# Patient Record
Sex: FEMALE | Race: WHITE | NOT HISPANIC OR LATINO | Employment: FULL TIME | URBAN - METROPOLITAN AREA
[De-identification: names, ages, dates, MRNs, and addresses within clinical notes are randomized per-mention and may not be internally consistent; named-entity substitution may affect disease eponyms.]

---

## 2019-07-26 ENCOUNTER — OFFICE VISIT - HEALTHEAST (OUTPATIENT)
Dept: FAMILY MEDICINE | Facility: CLINIC | Age: 59
End: 2019-07-26

## 2019-07-26 DIAGNOSIS — R68.89 HEAT INTOLERANCE: ICD-10-CM

## 2019-07-26 DIAGNOSIS — M79.89 SWELLING OF RIGHT LITTLE FINGER: ICD-10-CM

## 2019-07-30 ENCOUNTER — OFFICE VISIT - HEALTHEAST (OUTPATIENT)
Dept: FAMILY MEDICINE | Facility: CLINIC | Age: 59
End: 2019-07-30

## 2019-07-30 DIAGNOSIS — Z13.1 SCREENING FOR DIABETES MELLITUS: ICD-10-CM

## 2019-07-30 DIAGNOSIS — Z11.59 ENCOUNTER FOR HEPATITIS C SCREENING TEST FOR LOW RISK PATIENT: ICD-10-CM

## 2019-07-30 DIAGNOSIS — M79.89 SWELLING OF RIGHT LITTLE FINGER: ICD-10-CM

## 2019-07-30 DIAGNOSIS — Z11.4 ENCOUNTER FOR SCREENING FOR HIV: ICD-10-CM

## 2019-07-30 DIAGNOSIS — Z13.220 SCREENING CHOLESTEROL LEVEL: ICD-10-CM

## 2019-07-30 ASSESSMENT — MIFFLIN-ST. JEOR: SCORE: 1263.95

## 2019-08-01 ENCOUNTER — COMMUNICATION - HEALTHEAST (OUTPATIENT)
Dept: SCHEDULING | Facility: CLINIC | Age: 59
End: 2019-08-01

## 2020-01-28 ENCOUNTER — COMMUNICATION - HEALTHEAST (OUTPATIENT)
Dept: FAMILY MEDICINE | Facility: CLINIC | Age: 60
End: 2020-01-28

## 2021-01-21 PROBLEM — M81.8 ADULT IDIOPATHIC GENERALIZED OSTEOPOROSIS: Status: ACTIVE | Noted: 2021-01-21

## 2021-01-21 SDOH — HEALTH STABILITY: MENTAL HEALTH: HOW OFTEN DO YOU HAVE 6 OR MORE DRINKS ON ONE OCCASION?: NOT ASKED

## 2021-01-21 SDOH — HEALTH STABILITY: MENTAL HEALTH: HOW MANY STANDARD DRINKS CONTAINING ALCOHOL DO YOU HAVE ON A TYPICAL DAY?: NOT ASKED

## 2021-01-21 SDOH — HEALTH STABILITY: MENTAL HEALTH: HOW OFTEN DO YOU HAVE A DRINK CONTAINING ALCOHOL?: NOT ASKED

## 2021-01-26 ENCOUNTER — VIRTUAL VISIT (OUTPATIENT)
Dept: NEUROLOGY | Facility: CLINIC | Age: 61
End: 2021-01-26
Payer: COMMERCIAL

## 2021-01-26 VITALS — HEIGHT: 67 IN | BODY MASS INDEX: 23.23 KG/M2 | WEIGHT: 148 LBS

## 2021-01-26 DIAGNOSIS — G24.3 SPASMODIC TORTICOLLIS: Primary | ICD-10-CM

## 2021-01-26 DIAGNOSIS — M81.8 ADULT IDIOPATHIC GENERALIZED OSTEOPOROSIS: ICD-10-CM

## 2021-01-26 PROCEDURE — 99213 OFFICE O/P EST LOW 20 MIN: CPT | Mod: 95 | Performed by: PSYCHIATRY & NEUROLOGY

## 2021-01-26 RX ORDER — CLONAZEPAM 0.5 MG/1
0.5 TABLET ORAL 2 TIMES DAILY
Qty: 60 TABLET | Refills: 5 | Status: SHIPPED | OUTPATIENT
Start: 2021-01-26 | End: 2021-08-02

## 2021-01-26 RX ORDER — CLONAZEPAM 0.5 MG/1
0.5 TABLET ORAL 2 TIMES DAILY
COMMUNITY
Start: 2021-01-04 | End: 2021-01-26

## 2021-01-26 RX ORDER — IBUPROFEN 200 MG
200 TABLET ORAL PRN
COMMUNITY

## 2021-01-26 ASSESSMENT — MIFFLIN-ST. JEOR: SCORE: 1263.95

## 2021-01-26 NOTE — NURSING NOTE
Chief Complaint   Patient presents with     Annual Visit     Follow up for torticollis. Pt states no concerns, has been doing well.     Phone visit     [phone]     Maame Acuna LPN on 1/26/2021 at 10:14 AM

## 2021-01-26 NOTE — LETTER
"    1/26/2021         RE: Adriane Sylvester  [address]  [address]  [facility] [address]        Dear Colleague,    Thank you for referring your patient, Adriane Sylvester, to the [facility] [place]. Please see a copy of my visit note below.    Telephone follow-up visit requested by patient  Telephone follow-up visit due to the COVID-19 pandemic  Telephone number [phone]  Patient identified    The patient has been notified of following:     \"This telephone visit will be conducted via a call between you and your physician/provider. We have found that certain health care needs can be provided without the need for a physical exam. This service lets us provide the care you need with a short phone conversation. If a prescription is necessary we can send it directly to your pharmacy. If lab work is needed we can place an order for that and you can then stop by our lab to have the test done at a later time.    If during the course of the call the physician/provider feels a telephone visit is not appropriate, you will not be charged for this service.\"     Patient has given verbal consent for Telephone visit? Yes  Consent has been obtained for this service by 1 care team member: yes.              Chief complaint  Torticollis is stable  Does get some \"headaches\" more of a jaw achiness on the left side  Not new      History of Present Illness   Seen January 20, 2020  Today's visit January 26, 2021 telephone follow-up visit        60-year-old here for follow-up of her torticollis  She has head turning tilt with slight turned to the left with the left sternocleidomastoid muscle looking tighter    Since last seen  No hospitalizations  No surgeries  No major illnesses    Does have some chronic \"headache\" more of an achiness in the left jaw saw a chiropractor in the past  Question whether a mouth block would help a little bit for some TMJ aggravation secondary to her torticollis  Discussed the use of " "clonazepam and the importance of not going off the medication abruptly as her body is used to it.  Talked about the risks and benefits of medications  Has had no problems with the medication    She also has some osteoporosis probably and supposed to be on some vitamin D did decide to start taking it now  Rediscussed the fact that she did not get her DEXA scan, that she needs to be on the vitamin D to prevent further osteoporosis.  Rediscussed the medication, rediscussed osteoporosis, and vitamin D replacement.    We discussed the risk benefits of the clonazepam        She has the diagnoses of:    1. Torticollis diagnosed in 1996 by Dr. Mica Myrick.  2. History of being on clonazepam for a long time since 1999.  3. We have discussed Botox in the past. She has chosen not to use that, has had success with clonazepam.  4. Has failed multiple medications including Artane, baclofen, and Tegretol in the past.  5. Knows the risk of coming off the clonazepam quickly, knows that she would have to taper off slowly, re-reviewed these today.      Family history  Mother with dementia, hypertension  Father with heart disease lived to be 93  History of diabetes and osteoporosis in the family      Habits:  She is a nonsmoker, might have a rare alcoholic beverage.        Review of Systems     Has \"some headaches\"  These are really more of an achiness in her left jaw     no chest pain no shortness of breath no nausea vomiting no diarrhea no fever chills    No diplopia dysarthria dysphasia  No focal weakness numbness or tingling torticollis is the same tolerates it well no significant tremor no gait problems no bowel or bladder difficulties       Exam documentation from previous in person evaluations for comparison in the future     HEENT examination significant for a normal head tilt he only has both a turn to the left and a tilt her left sternocleidomastoid muscle looks a little bit tighter she also has needed right clavicle she " has some kyphosis and she has some head to the patient  She has some voice tremor    She has clear lungs regular heart rate symmetrical pulses no edema the feet    Neurologic exam  Torticollis as above  Mental status is normal  No aphasia no neglect normal memory recall    Cranial nerves II through XII  Some tremor in her voice  Some head to the patient  Extractor movements intact no nystagmus ophthalmoplegia no visual field cut tongue twisters are understandable face is symmetrical smile symmetrical    Upper extremities no significant tremor finger-nose is good no focal weakness    Motor strength coronation gait are normal  She has kyphosis we discussed osteoporosis and vitamin D replacement      Patient states that she is stable      Assessment/Plan         1.  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  Question some left TMJ symptomatology aggravated by her spasmodic torticollis    Clonazepam 0.5 mg tablet 1 tab p.o. twice daily need another prescription 6 months  Rediscussed the medication, rediscussed osteoporosis, and vitamin D replacement.    Discussed possibly having her get a mouth block might need to see a specialist to have it fitted she is going to be seeing her dentist in the near future      Current plan:    1. Clonazepam 0.5 mg one tablet p.o. b.i.d. She will need another prescription update in six months.  2. Follow up on a yearly basis.  3. Should stay on the vitamin D, rediscussed.  4. Needs to have a primary physician who sees her regularly for healthcare needs.      Should be on some vitamin D osteoporosis  Should follow-up closely with her primary for healthcare maintenance        Set patient up for in person evaluation November 2021 as this was a telephone visit    21 minutes of discussion time today with patient about multiple symptoms and signs above medications risks and benefits.            Again, thank you for allowing me to participate in the care of your patient.        Sincerely,        Drake  Los Velazquez MD

## 2021-05-30 NOTE — PROGRESS NOTES
"[facility] Clinic Note    Name: Adriane Sylvester  : 1960   MRN: [id number]    Adriane Sylvester is a 59 y.o. female presenting to discuss the following:     CC:   Chief Complaint   Patient presents with     Follow-up     Right pinky pain and swelling       HPI:  Previously seen at [facility], hasn't been seen for a while.     Right handed female, right pinky finger has been bent for last 1 year, started to have pain intermittently that shoots down finger, consistently swollen, not worsening. Went to urgent care and had imaging done, recommended following up with primary care for discussion of chronic swelling.     ROS:   See HPI above.     PMH:   Patient Active Problem List   Diagnosis     Spasmodic Torticollis     Vaginitis     Seborrheic Keratosis     Depression     Follows with Dr. Velazquez at [facility] for spasmodic torticollis and prescription of benzodiazepine.     No past medical history on file.    PSH:   No past surgical history on file.      MEDICATIONS:   Current Outpatient Medications on File Prior to Visit   Medication Sig Dispense Refill     clonazePAM (KLONOPIN) 0.5 MG tablet Take 0.5 mg by mouth 2 (two) times a day.  5     ibuprofen (ADVIL,MOTRIN) 200 MG tablet Take 200 mg by mouth every 6 (six) hours as needed for pain.       No current facility-administered medications on file prior to visit.        ALLERGIES:  No Known Allergies    FAMHx:  Family History   Problem Relation Age of Onset     Dementia Mother      Gout Father      Diabetes Father         in 70's     Breast cancer Sister         in 40's     Colon cancer Neg Hx        SOCIAL HISTORY:   Social History     Tobacco Use     Smoking status: Never Smoker     Smokeless tobacco: Never Used   Substance Use Topics     Alcohol use: Not on file     Drug use: Not on file       PHYSICAL EXAM:   /70   Pulse 72   Temp 97.9  F (36.6  C) (Oral)   Resp 12   Ht 5' 7\" (1.702 m)   Wt 148 lb (67.1 kg)   BMI 23.18 kg/m  "    GENERAL: Adriane is a pleasant, well appearing female, appears stated age, in no acute distress.   HEENT: Sclera white, no nasal discharge, oropharynx pink and moist.   HEART: Regular rate and rhythm, no murmurs.   LUNGS: Clear to auscultation bilaterally, unlabored.   ABDOMEN: Soft, non-tender to palpation  MSK: Right pinky finger soft tissue around middle phalanx is mildly edematous and erythematous. PIP and DIP do not appear effected. No synovitis or warmth appreciated. No focal fluctuance. Range of motion intact.     IMAGING: (from walk in care)  HAND X-RAY: Degenerative change at the right small finger IP joints. No erosive  changes are identified. Soft tissue swelling is noted adjacent to the middle  phalanx, but this does not appear centered on the joint. There is suggestion of  some increased density consistent with a focal nodular mass, and MRI may be  helpful for further evaluation if indicated.    ASSESSMENT & PLAN:   Adriane Sylvester is a 59 y.o. female presenting today for evaluation of right hand pinky swelling, chronic. Due for health maintenance screening, not fasting so will return for labs.     1. Swelling of right little finger  - Ambulatory referral to Orthopedics    Symptoms are not consistent with ganglion cyst, cellulitis, focal abscess, sebaceous cyst, osteoarthritis, rheumatoid arthritis, gout, or pseudogout. No acute changes to symptoms. Recommended referral to hand orthopedics for further evaluation.     2. Screening cholesterol level  - Lipid Cascade FASTING; Future    3. Screening for diabetes mellitus  - Basic Metabolic Panel; Future    4. Encounter for screening for HIV  - HIV Antigen/Antibody Screening Cascade; Future    5. Encounter for hepatitis C screening test for low risk patient  - Hepatitis C Antibody (Anti-HCV); Future    RTC: Due for health maintenance/preventive exam - return for annual physical exam in 1 month.     Kemi White DO

## 2021-05-31 NOTE — TELEPHONE ENCOUNTER
Spoke to pt, relayed Dr. White's message below.  Pt verbalizes understanding, no further questions.

## 2021-06-03 VITALS — BODY MASS INDEX: 23.23 KG/M2 | HEIGHT: 67 IN | WEIGHT: 148 LBS

## 2021-06-03 VITALS — WEIGHT: 148 LBS

## 2021-06-20 NOTE — LETTER
Letter by Kemi White DO at      Author: Kemi White DO Service: -- Author Type: --    Filed:  Encounter Date: 1/28/2020 Status: (Other)       Adriane Sylvester  [address]  [facility] [address]           January 28, 2020    Dear Adriane    In reviewing your records, we have determined a gap in your preventive services. Based on your age and health history, we recommend the follow service.     ? General Physical  ? Physical with a Pap Smear  ? Colon cancer screening  ? Mammogram  ? Immunization  ? Diabetic check  ? Blood pressure/cardiovascular check  ? Asthma check  ? Cholesterol test  ? Lab work  ? Med check    If you have had the service elsewhere, please contact us so we can update our records. Please let us know if you have transferred your care to another clinic.    Please call [phone] to schedule this appointment.    We believe that a strong preventive care program, including regular physicals and follow-up care is an important part of a healthy lifestyle and we are committed to helping you maintain your health.    Thank you for choosing us as your health care provider.    Sincerely,   [facility]  [address] [place] [id number]  Phone Number:  [phone]

## 2021-06-27 NOTE — PROGRESS NOTES
Progress Notes by Yocasta Umaña PA-C at 7/26/2019 11:50 AM     Author: Yocasta Umaña PA-C Service: -- Author Type: Physician Assistant    Filed: 7/26/2019  3:11 PM Encounter Date: 7/26/2019 Status: Signed    : Yocasta Umaña PA-C (Physician Assistant)       Chief Complaint   Patient presents with   ? Hand Pain     right pinky finger swelling x1 year       HPI:  Adriane Sylvester is a 58 y.o. female who presents today complaining of right pinky finger swelling for the past 1 year.  The swelling comes and goes.  She denies any known injury.  She never thought much of it, but lately she is been having shooting pains with the swelling.  She is never been evaluated for this finger swelling before.  She is postmenopausal and her father had a history of gout.    History obtained from the patient.    Problem List:  Spasmodic Torticollis  Vaginitis  Seborrheic Keratosis  Depression      No past medical history on file.    Social History     Tobacco Use   ? Smoking status: Never Smoker   ? Smokeless tobacco: Never Used   Substance Use Topics   ? Alcohol use: Not on file       Review of Systems   Constitutional: Positive for unexpected weight change (Gaining weight more easily.).   Endocrine: Positive for heat intolerance. Negative for cold intolerance, polydipsia, polyphagia and polyuria.       Vitals:    07/26/19 1207   BP: 104/70   Patient Site: Right Arm   Patient Position: Sitting   Cuff Size: Adult Regular   Pulse: 69   Resp: 16   Temp: 98.2  F (36.8  C)   TempSrc: Oral   SpO2: 96%   Weight: 148 lb (67.1 kg)       Physical Exam   Constitutional: She appears well-developed and well-nourished. No distress.   HENT:   Head: Normocephalic and atraumatic.   Right Ear: External ear normal.   Left Ear: External ear normal.   Eyes: Conjunctivae are normal. Right eye exhibits no discharge. Left eye exhibits no discharge.   Pulmonary/Chest: Effort normal. No respiratory distress.   Musculoskeletal:        Right hand: She  exhibits swelling (soft tissue swelling between right 5th PIP and DIP joint). She exhibits normal range of motion, no tenderness, no bony tenderness, normal capillary refill and no laceration. Normal sensation noted. Normal strength noted.   Skin: Skin is warm. Capillary refill takes less than 2 seconds. No rash noted. She is not diaphoretic. No erythema.   Psychiatric: She has a normal mood and affect. Her behavior is normal. Judgment and thought content normal.           Radiology:  I have personally ordered and preliminarily reviewed the following xray, I have noted no signs of bony deformity, fractures, osteophytes.  There is soft tissue swelling between the DIP and PIP.  Xr Finger Right 2 Or More Vws    Result Date: 7/26/2019  EXAM DATE:         07/26/2019 EXAM: X-RAY EXAM OF FINGER(S),RIGHT,MINIMUM TWO VIEWS LOCATION: [facility] DATE/TIME: 7/26/2019 12:30 PM INDICATION: NKI. Intermittent swelling x one year. Swelling is not really over joint, but looking for gout and OA or RA signs. COMPARISON: None. FINDINGS: Degenerative change at the right small finger IP joints. No erosive changes are identified. Soft tissue swelling is noted adjacent to the middle phalanx, but this does not appear centered on the joint. There is suggestion of some increased density consistent with a focal nodular mass, and MRI may be helpful for further evaluation if indicated.       Clinical Decision Making:  Recommend follow-up with primary care for further discussion of chronic finger swelling.  Patient is well overdue for health maintenance.  After the image, patient did not go to lab, but of a low suspicion for RA, OA, osteomy-itis, or gout considering no erosive findings in the joint.  These labs were canceled.  Would consider getting a TSH at follow-up visit if patient is having blood drawn for health maintenance reasons.   At the end of the encounter, I discussed results, diagnosis, medications. Discussed red  flags for immediate return to clinic/ER, as well as indications for follow up if no improvement. Patient understood and agreed to plan. Patient was stable for discharge.    1. Swelling of right little finger  XR Finger Right 2 or More VWS    CANCELED: Erythrocyte Sedimentation Rate    CANCELED: Uric Acid    CANCELED: C-Reactive Protein    CANCELED: HM2(CBC w/o Differential)   2. Heat intolerance  CANCELED: Thyroid Stimulating Hormone (TSH)         Patient Instructions   1. Follow up with primary care about possible nodule in the finger.

## 2021-08-01 DIAGNOSIS — G24.3 SPASMODIC TORTICOLLIS: ICD-10-CM

## 2021-08-01 DIAGNOSIS — M81.8 ADULT IDIOPATHIC GENERALIZED OSTEOPOROSIS: ICD-10-CM

## 2021-08-02 RX ORDER — CLONAZEPAM 0.5 MG/1
TABLET ORAL
Qty: 60 TABLET | Refills: 3 | Status: SHIPPED | OUTPATIENT
Start: 2021-08-02 | End: 2021-12-06

## 2021-08-02 NOTE — TELEPHONE ENCOUNTER
Refill request for clonazepam.  Last seen 1/26/21. Due for next appt 11/2021.  Medication T'd for review and signature    Maame Acuna LPN on 8/2/2021 at 10:02 AM

## 2021-12-04 DIAGNOSIS — M81.8 ADULT IDIOPATHIC GENERALIZED OSTEOPOROSIS: ICD-10-CM

## 2021-12-04 DIAGNOSIS — G24.3 SPASMODIC TORTICOLLIS: ICD-10-CM

## 2021-12-04 NOTE — LETTER
12/4/2021        RE: Adriane Sylvester  [address]  [address]  [facility] [address]                Dear Adriane,         We recently provided you with medication refills.  Many medications require routine follow-up with your doctor.    Just a reminder that you will be due to see Dr. Velazquez for an appointment in January. Please call to schedule an appointment at your earliest convenience.          Sincerely,        [facility]     (Formerly known as [facility])  Dr. Velazquez Care Team

## 2021-12-06 RX ORDER — CLONAZEPAM 0.5 MG/1
TABLET ORAL
Qty: 60 TABLET | Refills: 2 | Status: SHIPPED | OUTPATIENT
Start: 2021-12-06 | End: 2022-03-03

## 2021-12-06 NOTE — TELEPHONE ENCOUNTER
Refill request for clonazepam.  Due for appt in January.  Letter mailed to pt to remind to schedule an appt in January.  Medication T'd for review and signature    Maame Acuna LPN on 12/6/2021 at 3:45 PM

## 2022-02-28 ENCOUNTER — HOSPITAL ENCOUNTER (EMERGENCY)
Facility: HOSPITAL | Age: 62
Discharge: HOME | End: 2022-02-28
Admitting: PHYSICIAN ASSISTANT
Payer: COMMERCIAL

## 2022-02-28 ENCOUNTER — NURSE TRIAGE (OUTPATIENT)
Dept: NURSING | Facility: CLINIC | Age: 62
End: 2022-02-28
Payer: COMMERCIAL

## 2022-02-28 ENCOUNTER — APPOINTMENT (OUTPATIENT)
Dept: RADIOLOGY | Facility: HOSPITAL | Age: 62
End: 2022-02-28
Attending: EMERGENCY MEDICINE
Payer: COMMERCIAL

## 2022-02-28 VITALS
RESPIRATION RATE: 16 BRPM | SYSTOLIC BLOOD PRESSURE: 130 MMHG | HEART RATE: 85 BPM | HEIGHT: 67 IN | BODY MASS INDEX: 21.97 KG/M2 | WEIGHT: 140 LBS | DIASTOLIC BLOOD PRESSURE: 93 MMHG | OXYGEN SATURATION: 96 % | TEMPERATURE: 98.4 F

## 2022-02-28 DIAGNOSIS — M62.838 MUSCLE SPASM: ICD-10-CM
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
## 2022-02-28 PROBLEM — L82.1 SEBORRHEIC KERATOSIS: Status: ACTIVE | Noted: 2022-02-28

## 2022-02-28 PROBLEM — N76.0 VAGINITIS: Status: ACTIVE | Noted: 2022-02-28

## 2022-02-28 PROBLEM — F32.A DEPRESSION: Status: ACTIVE | Noted: 2022-02-28

## 2022-02-28 LAB
ANION GAP SERPL CALCULATED.3IONS-SCNC: 9 MMOL/L (ref 5–18)
ATRIAL RATE - MUSE: 70 BPM
BUN SERPL-MCNC: 19 MG/DL (ref 8–22)
CALCIUM SERPL-MCNC: 9.3 MG/DL (ref 8.5–10.5)
CHLORIDE BLD-SCNC: 106 MMOL/L (ref 98–107)
CO2 SERPL-SCNC: 25 MMOL/L (ref 22–31)
CREAT SERPL-MCNC: 0.79 MG/DL (ref 0.6–1.1)
DIASTOLIC BLOOD PRESSURE - MUSE: NORMAL MMHG
ERYTHROCYTE [DISTWIDTH] IN BLOOD BY AUTOMATED COUNT: 12.8 % (ref 10–15)
GFR SERPL CREATININE-BSD FRML MDRD: 84 ML/MIN/1.73M2
GLUCOSE BLD-MCNC: 97 MG/DL (ref 70–125)
HCT VFR BLD AUTO: 38.2 % (ref 35–47)
HGB BLD-MCNC: 12.5 G/DL (ref 11.7–15.7)
INTERPRETATION ECG - MUSE: NORMAL
MCH RBC QN AUTO: 30.5 PG (ref 26.5–33)
MCHC RBC AUTO-ENTMCNC: 32.7 G/DL (ref 31.5–36.5)
MCV RBC AUTO: 93 FL (ref 78–100)
P AXIS - MUSE: 52 DEGREES
PLATELET # BLD AUTO: 198 10E3/UL (ref 150–450)
POTASSIUM BLD-SCNC: 4.6 MMOL/L (ref 3.5–5)
PR INTERVAL - MUSE: 196 MS
QRS DURATION - MUSE: 86 MS
QT - MUSE: 390 MS
QTC - MUSE: 421 MS
R AXIS - MUSE: 27 DEGREES
RBC # BLD AUTO: 4.1 10E6/UL (ref 3.8–5.2)
SODIUM SERPL-SCNC: 140 MMOL/L (ref 136–145)
SYSTOLIC BLOOD PRESSURE - MUSE: NORMAL MMHG
T AXIS - MUSE: 50 DEGREES
TROPONIN I SERPL-MCNC: <0.01 NG/ML (ref 0–0.29)
VENTRICULAR RATE- MUSE: 70 BPM
WBC # BLD AUTO: 8.6 10E3/UL (ref 4–11)

## 2022-02-28 PROCEDURE — 93005 ELECTROCARDIOGRAM TRACING: CPT | Performed by: EMERGENCY MEDICINE

## 2022-02-28 PROCEDURE — 84484 ASSAY OF TROPONIN QUANT: CPT | Performed by: PHYSICIAN ASSISTANT

## 2022-02-28 PROCEDURE — 71046 X-RAY EXAM CHEST 2 VIEWS: CPT

## 2022-02-28 PROCEDURE — 85027 COMPLETE CBC AUTOMATED: CPT | Performed by: PHYSICIAN ASSISTANT

## 2022-02-28 PROCEDURE — 36415 COLL VENOUS BLD VENIPUNCTURE: CPT | Performed by: PHYSICIAN ASSISTANT

## 2022-02-28 PROCEDURE — 99285 EMERGENCY DEPT VISIT HI MDM: CPT | Mod: 25

## 2022-02-28 PROCEDURE — 250N000013 HC RX MED GY IP 250 OP 250 PS 637: Performed by: PHYSICIAN ASSISTANT

## 2022-02-28 PROCEDURE — 82310 ASSAY OF CALCIUM: CPT | Performed by: PHYSICIAN ASSISTANT

## 2022-02-28 PROCEDURE — 72070 X-RAY EXAM THORAC SPINE 2VWS: CPT

## 2022-02-28 RX ORDER — CYCLOBENZAPRINE HCL 10 MG
10 TABLET ORAL ONCE
Status: COMPLETED | OUTPATIENT
Start: 2022-02-28 | End: 2022-02-28

## 2022-02-28 RX ORDER — LIDOCAINE 4 G/G
1 PATCH TOPICAL
Status: DISCONTINUED | OUTPATIENT
Start: 2022-02-28 | End: 2022-02-28 | Stop reason: HOSPADM

## 2022-02-28 RX ORDER — CYCLOBENZAPRINE HCL 10 MG
10 TABLET ORAL 3 TIMES DAILY PRN
Qty: 10 TABLET | Refills: 0 | Status: SHIPPED | OUTPATIENT
Start: 2022-02-28 | End: 2022-03-06

## 2022-02-28 RX ADMIN — CYCLOBENZAPRINE 10 MG: 10 TABLET, FILM COATED ORAL at 18:03

## 2022-02-28 RX ADMIN — LIDOCAINE 1 PATCH: 246 PATCH TOPICAL at 18:02

## 2022-02-28 ASSESSMENT — ENCOUNTER SYMPTOMS
VOMITING: 0
NAUSEA: 0
HEADACHES: 0
ACTIVITY CHANGE: 0
SHORTNESS OF BREATH: 0
SPEECH DIFFICULTY: 0
CHEST TIGHTNESS: 0
COUGH: 0
BACK PAIN: 1
FLANK PAIN: 0
MYALGIAS: 0
COLOR CHANGE: 1
WHEEZING: 0
ARTHRALGIAS: 0
LIGHT-HEADEDNESS: 0
TREMORS: 0
ABDOMINAL PAIN: 0
DIZZINESS: 0
WOUND: 0
FATIGUE: 0
FEVER: 0
WEAKNESS: 0
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
## 2022-02-28 NOTE — Clinical Note
Adriane Sylvester was seen and treated in our emergency department on 2/28/2022.  She may return to work on 03/03/2022.       If you have any questions or concerns, please don't hesitate to call.      Olesya Carlton PA-C
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
## 2022-03-02 DIAGNOSIS — M81.8 ADULT IDIOPATHIC GENERALIZED OSTEOPOROSIS: ICD-10-CM

## 2022-03-02 DIAGNOSIS — G24.3 SPASMODIC TORTICOLLIS: ICD-10-CM

## 2022-03-03 NOTE — TELEPHONE ENCOUNTER
Refill request for Klonopin. Pt last seen 1/26/21 and has follow up scheduled for 6/15/22.   Medication T'd for review and signature    Valentina Bello RN on 3/3/2022 at 8:32 AM

## 2022-03-04 RX ORDER — CLONAZEPAM 0.5 MG/1
TABLET ORAL
Qty: 60 TABLET | Refills: 3 | Status: SHIPPED | OUTPATIENT
Start: 2022-03-04 | End: 2022-06-15

## 2022-04-29 NOTE — TELEPHONE ENCOUNTER
Who is calling:  Adriane Sylvester  Reason for Call:  Patient is confused why Dr. White could not pulll up her x rays.  She said she wants to see them and wants a call back.  Date of last appointment with primary care: 7/30/19  Okay to leave a detailed message: Yes       none
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
## 2022-06-15 ENCOUNTER — OFFICE VISIT (OUTPATIENT)
Dept: NEUROLOGY | Facility: CLINIC | Age: 62
End: 2022-06-15
Payer: COMMERCIAL

## 2022-06-15 VITALS
HEART RATE: 77 BPM | HEIGHT: 67 IN | DIASTOLIC BLOOD PRESSURE: 74 MMHG | WEIGHT: 140 LBS | SYSTOLIC BLOOD PRESSURE: 98 MMHG | BODY MASS INDEX: 21.97 KG/M2

## 2022-06-15 DIAGNOSIS — M81.8 ADULT IDIOPATHIC GENERALIZED OSTEOPOROSIS: ICD-10-CM

## 2022-06-15 DIAGNOSIS — G24.3 SPASMODIC TORTICOLLIS: Primary | ICD-10-CM

## 2022-06-15 PROCEDURE — 99214 OFFICE O/P EST MOD 30 MIN: CPT | Performed by: PSYCHIATRY & NEUROLOGY

## 2022-06-15 RX ORDER — CLONAZEPAM 0.5 MG/1
TABLET ORAL
Qty: 60 TABLET | Refills: 5 | Status: SHIPPED | OUTPATIENT
Start: 2022-06-15 | End: 2023-01-02

## 2022-06-15 NOTE — NURSING NOTE
Chief Complaint   Patient presents with     Spasmodic torticollis     Annual follow up. Pt States she is doing well, no concerns.     Maame Acuna LPN on 6/15/2022 at 12:26 PM
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
## 2022-12-31 DIAGNOSIS — G24.3 SPASMODIC TORTICOLLIS: ICD-10-CM

## 2022-12-31 DIAGNOSIS — M81.8 ADULT IDIOPATHIC GENERALIZED OSTEOPOROSIS: ICD-10-CM

## 2023-01-02 RX ORDER — CLONAZEPAM 0.5 MG/1
TABLET ORAL
Qty: 60 TABLET | Refills: 0 | Status: SHIPPED | OUTPATIENT
Start: 2023-01-02 | End: 2023-02-03

## 2023-02-02 DIAGNOSIS — M81.8 ADULT IDIOPATHIC GENERALIZED OSTEOPOROSIS: ICD-10-CM

## 2023-02-02 DIAGNOSIS — G24.3 SPASMODIC TORTICOLLIS: ICD-10-CM

## 2023-02-03 RX ORDER — CLONAZEPAM 0.5 MG/1
TABLET ORAL
Qty: 60 TABLET | Refills: 4 | Status: SHIPPED | OUTPATIENT
Start: 2023-02-03 | End: 2023-06-16

## 2023-06-16 ENCOUNTER — OFFICE VISIT (OUTPATIENT)
Dept: NEUROLOGY | Facility: CLINIC | Age: 63
End: 2023-06-16
Payer: COMMERCIAL

## 2023-06-16 VITALS
HEART RATE: 79 BPM | DIASTOLIC BLOOD PRESSURE: 73 MMHG | HEIGHT: 67 IN | SYSTOLIC BLOOD PRESSURE: 101 MMHG | BODY MASS INDEX: 22.6 KG/M2 | WEIGHT: 144 LBS

## 2023-06-16 DIAGNOSIS — G24.3 SPASMODIC TORTICOLLIS: Primary | ICD-10-CM

## 2023-06-16 DIAGNOSIS — M81.8 ADULT IDIOPATHIC GENERALIZED OSTEOPOROSIS: ICD-10-CM

## 2023-06-16 PROCEDURE — 99214 OFFICE O/P EST MOD 30 MIN: CPT | Performed by: PSYCHIATRY & NEUROLOGY

## 2023-06-16 RX ORDER — CLONAZEPAM 0.5 MG/1
0.5 TABLET ORAL 2 TIMES DAILY
Qty: 60 TABLET | Refills: 5 | Status: SHIPPED | OUTPATIENT
Start: 2023-06-16 | End: 2024-01-02
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
## 2023-06-16 NOTE — NURSING NOTE
Chief Complaint   Patient presents with     Spasmodic torticollis      Annual follow up. No concerns.     Maame Acuna LPN on 6/16/2023 at 12:24 PM
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
## 2023-07-14 ENCOUNTER — TELEPHONE (OUTPATIENT)
Dept: NEUROLOGY | Facility: CLINIC | Age: 63
End: 2023-07-14
Payer: COMMERCIAL

## 2023-07-14 NOTE — TELEPHONE ENCOUNTER
Left message for patient to return call to clinic with concerns. However we do not do anything with billing and will not be able to offer much help.

## 2023-07-14 NOTE — TELEPHONE ENCOUNTER
"Called and spoke to pt. She states \"I don't know why an office visit is so expensive! This is crazy!\" Attempted to explain to pt, she became very upset and started yelling \"I am not going to keep coming back to you. If it is this expensive, I'm not coming back to you no more!!!\" Pt hung up on writer.    Maame Acuna LPN on 7/14/2023 at 4:04 PM    "

## 2023-07-14 NOTE — TELEPHONE ENCOUNTER
[facility]    Phone Message    May a detailed message be left on voicemail: yes     Reason for Call: Other:   Pt called returning call from Fadi Chin MA.  Pt insist on talking with care team.    Please call Pt back at [phone].    Action Taken: Message routed to:  Other: [facility]    Travel Screening: Not Applicable

## 2023-07-14 NOTE — TELEPHONE ENCOUNTER
Health Call Center    Phone Message    May a detailed message be left on voicemail: yes     Reason for Call: Other:   Pt called requesting to speak with care team about her bill.  Writer did recommend that she speak with billing or her insurance company and she states she had.  Pt is inquiring on if there is something available to lower the cost of her yearly visits.    Please call Pt back at [phone] to advise.    Action Taken: Message routed to:  Other: [facility]    Travel Screening: Not Applicable

## 2024-01-01 DIAGNOSIS — G24.3 SPASMODIC TORTICOLLIS: ICD-10-CM

## 2024-01-01 DIAGNOSIS — M81.8 ADULT IDIOPATHIC GENERALIZED OSTEOPOROSIS: ICD-10-CM

## 2024-01-02 RX ORDER — CLONAZEPAM 0.5 MG/1
0.5 TABLET ORAL 2 TIMES DAILY
Qty: 60 TABLET | Refills: 5 | Status: SHIPPED | OUTPATIENT
Start: 2024-01-02 | End: 2024-06-17

## 2024-01-02 NOTE — TELEPHONE ENCOUNTER
Refill request for: clonazepam 0.5mg  Directions: Take 1 tablet (0.5 mg) by mouth 2 times daily     LOV: 06/16/23  NOV: 06/17/24    30 day supply with 5 refills Medication T'd for review and signature    Maame Acuna LPN on 1/2/2024 at 12:26 PM

## 2024-05-02 ENCOUNTER — OFFICE VISIT (OUTPATIENT)
Dept: FAMILY MEDICINE | Facility: CLINIC | Age: 64
End: 2024-05-02
Payer: COMMERCIAL

## 2024-05-02 VITALS
OXYGEN SATURATION: 96 % | RESPIRATION RATE: 18 BRPM | HEART RATE: 88 BPM | HEIGHT: 67 IN | WEIGHT: 137 LBS | SYSTOLIC BLOOD PRESSURE: 127 MMHG | BODY MASS INDEX: 21.5 KG/M2 | TEMPERATURE: 97.8 F | DIASTOLIC BLOOD PRESSURE: 86 MMHG

## 2024-05-02 DIAGNOSIS — H61.21 IMPACTED CERUMEN OF RIGHT EAR: ICD-10-CM

## 2024-05-02 DIAGNOSIS — M81.8 ADULT IDIOPATHIC GENERALIZED OSTEOPOROSIS: ICD-10-CM

## 2024-05-02 DIAGNOSIS — Z12.11 SPECIAL SCREENING FOR MALIGNANT NEOPLASMS, COLON: ICD-10-CM

## 2024-05-02 DIAGNOSIS — M89.8X9 BONE MASS: ICD-10-CM

## 2024-05-02 DIAGNOSIS — Z12.31 VISIT FOR SCREENING MAMMOGRAM: ICD-10-CM

## 2024-05-02 DIAGNOSIS — Z00.00 ROUTINE GENERAL MEDICAL EXAMINATION AT A HEALTH CARE FACILITY: Primary | ICD-10-CM

## 2024-05-02 PROCEDURE — 69209 REMOVE IMPACTED EAR WAX UNI: CPT | Mod: RT | Performed by: PHYSICIAN ASSISTANT

## 2024-05-02 ASSESSMENT — PAIN SCALES - GENERAL: PAINLEVEL: NO PAIN (0)

## 2024-05-02 NOTE — PATIENT INSTRUCTIONS
At [facility], we strive to deliver an exceptional experience to you, every time we see you. If you receive a survey, please complete it as we do value your feedback.  If you have MyChart, you can expect to receive results automatically within 24 hours of their completion.  Your provider will send a note interpreting your results as well.   If you do not have MyChart, you should receive your results in about a week by mail.    Your care team:                            Family Medicine Internal Medicine   MD Charles Swenson, MD Yenny Ortiz, MD Willam Jackman, MD Camelia Garay, PA-C    Brannon Rahman, MD Pediatrics   Richard Moses, PA-C  Naima Hart, MD Jami Julien, MD Cindy Stratton APRN CNP   ASIM Persaud CNP, MD Roxi Holland, MD Graciela Mason, CNP  Same-Day (No follow up visit)   Uzair Hunter, MILAN Regalado, PA-C    Jacque العلي PAKattyC     Clinic hours: Monday - Thursday 7 am-6 pm; Fridays 7 am-5 pm.   Urgent care: Monday - Friday 10 am- 8 pm; Saturday and Sunday 9 am-5 pm.    Clinic: [phone]       [facility]: Monday - Thursday 8 am - 7 pm; Friday 8 am - 6 pm  [facility]: [phone]    Preventive Care Advice   This is general advice given by our system to help you stay healthy. However, your care team may have specific advice just for you. Please talk to your care team about your preventive care needs.  Nutrition  Eat 5 or more servings of fruits and vegetables each day.  Try wheat bread, brown rice and whole grain pasta (instead of white bread, rice, and pasta).  Get enough calcium and vitamin D. Check the label on foods and aim for 100% of the RDA (recommended daily allowance).  Lifestyle  Exercise at least 150 minutes each week   (30 minutes a day, 5 days a week).  Do muscle strengthening activities 2 days a week. These help  control your weight and prevent disease.  No smoking.  Wear sunscreen to prevent skin cancer.  Have a dental exam and cleaning every 6 months.  Yearly exams  See your health care team every year to talk about:  Any changes in your health.  Any medicines your care team has prescribed.  Preventive care, family planning, and ways to prevent chronic diseases.  Shots (vaccines)   HPV shots (up to age 26), if you've never had them before.  Hepatitis B shots (up to age 59), if you've never had them before.  COVID-19 shot: Get this shot when it's due.  Flu shot: Get a flu shot every year.  Tetanus shot: Get a tetanus shot every 10 years.  Pneumococcal, hepatitis A, and RSV shots: Ask your care team if you need these based on your risk.  Shingles shot (for age 50 and up).  General health tests  Diabetes screening:  Starting at age 35, Get screened for diabetes at least every 3 years.  If you are younger than age 35, ask your care team if you should be screened for diabetes.  Cholesterol test: At age 39, start having a cholesterol test every 5 years, or more often if advised.  Bone density scan (DEXA): At age 50, ask your care team if you should have this scan for osteoporosis (brittle bones).  Hepatitis C: Get tested at least once in your life.  STIs (sexually transmitted infections)  Before age 24: Ask your care team if you should be screened for STIs.  After age 24: Get screened for STIs if you're at risk. You are at risk for STIs (including HIV) if:  You are sexually active with more than one person.  You don't use condoms every time.  You or a partner was diagnosed with a sexually transmitted infection.  If you are at risk for HIV, ask about PrEP medicine to prevent HIV.  Get tested for HIV at least once in your life, whether you are at risk for HIV or not.  Cancer screening tests  Cervical cancer screening: If you have a cervix, begin getting regular cervical cancer screening tests at age 21. Most people who have  regular screenings with normal results can stop after age 65. Talk about this with your provider.  Breast cancer scan (mammogram): If you've ever had breasts, begin having regular mammograms starting at age 40. This is a scan to check for breast cancer.  Colon cancer screening: It is important to start screening for colon cancer at age 45.  Have a colonoscopy test every 10 years (or more often if you're at risk) Or, ask your provider about stool tests like a FIT test every year or Cologuard test every 3 years.  To learn more about your testing options, visit: https://www.Yik [employer]ak/596579.pdf.  For help making a decision, visit: https://bit.Gaosouyi/cj46617.  Prostate cancer screening test: If you have a prostate and are age 55 to 69, ask your provider if you would benefit from a yearly prostate cancer screening test.  Lung cancer screening: If you are a current or former smoker age 50 to 80, ask your care team if ongoing lung cancer screenings are right for you.  For informational purposes only. Not to replace the advice of your health care provider. Copyright   2023 [employer]. All rights reserved. Clinically reviewed by the [facility]. [employer] [id number] - REV 01/24.    Learning About Sleeping Well  What does sleeping well mean?     Sleeping well means getting enough sleep to feel good and stay healthy. How much sleep is enough varies among people.  The number of hours you sleep and how you feel when you wake up are both important. If you do not feel refreshed, you probably need more sleep. Another sign of not getting enough sleep is feeling tired during the day.  Experts recommend that adults get at least 7 or more hours of sleep per day. Children and older adults need more sleep.  Why is getting enough sleep important?  Getting enough quality sleep is a basic part of good health. When your sleep suffers, your physical health, mood, and your thoughts can suffer too. You may  "find yourself feeling more grumpy or stressed. Not getting enough sleep also can lead to serious problems, including injury, accidents, anxiety, and depression.  What might cause poor sleeping?  Many things can cause sleep problems, including:  Changes to your sleep schedule.  Stress. Stress can be caused by fear about a single event, such as giving a speech. Or you may have ongoing stress, such as worry about work or school.  Depression, anxiety, and other mental or emotional conditions.  Changes in your sleep habits or surroundings. This includes changes that happen where you sleep, such as noise, light, or sleeping in a different bed. It also includes changes in your sleep pattern, such as having jet lag or working a late shift.  Health problems, such as pain, breathing problems, and restless legs syndrome.  Lack of regular exercise.  Using alcohol, nicotine, or caffeine before bed.  How can you help yourself?  Here are some tips that may help you sleep more soundly and wake up feeling more refreshed.  Your sleeping area   Use your bedroom only for sleeping and sex. A bit of light reading may help you fall asleep. But if it doesn't, do your reading elsewhere in the house. Try not to use your TV, computer, smartphone, or tablet while you are in bed.  Be sure your bed is big enough to stretch out comfortably, especially if you have a sleep partner.  Keep your bedroom quiet, dark, and cool. Use curtains, blinds, or a sleep mask to block out light. To block out noise, use earplugs, soothing music, or a \"white noise\" machine.  Your evening and bedtime routine   Create a relaxing bedtime routine. You might want to take a warm shower or bath, or listen to soothing music.  Go to bed at the same time every night. And get up at the same time every morning, even if you feel tired.  What to avoid   Limit caffeine (coffee, tea, caffeinated sodas) during the day, and don't have any for at least 6 hours before bedtime.  Avoid " "drinking alcohol before bedtime. Alcohol can cause you to wake up more often during the night.  Try not to smoke or use tobacco, especially in the evening. Nicotine can keep you awake.  Limit naps during the day, especially close to bedtime.  Avoid lying in bed awake for too long. If you can't fall asleep or if you wake up in the middle of the night and can't get back to sleep within about 20 minutes, get out of bed and go to another room until you feel sleepy.  Avoid taking medicine right before bed that may keep you awake or make you feel hyper or energized. Your doctor can tell you if your medicine may do this and if you can take it earlier in the day.  If you can't sleep   Imagine yourself in a peaceful, pleasant scene. Focus on the details and feelings of being in a place that is relaxing.  Get up and do a quiet or boring activity until you feel sleepy.  Avoid drinking any liquids before going to bed to help prevent waking up often to use the bathroom.  Where can you learn more?  Go to https://www.MobileWebsites.net/patiented  Enter J942 in the search box to learn more about \"Learning About Sleeping Well.\"  Current as of: July 10, 2023               Content Version: 14.0    7922-0247 [employer].   Care instructions adapted under license by your healthcare professional. If you have questions about a medical condition or this instruction, always ask your healthcare professional. [employer], [employer] disclaims any warranty or liability for your use of this information.      "

## 2024-05-02 NOTE — PROGRESS NOTES
Assessment & Plan     Routine general medical examination at a health care facility  Reviewed health maintenance topics, patient not interested in screening or preventative care at this time.  We did discuss vaccinations that she could receive as well, not interested right now.  If having contaminated wound or significant injury should be seen for tetanus update.    Impacted cerumen of right ear  Removed by irrigation, improvement in symptoms.  Discussed wax care.  - LA REMOVAL IMPACTED CERUMEN IRRIGATION/LVG UNILAT    Bone mass  Ongoing in her finger, not worsening.  Noted in 2019, reviewed x-ray read.  Without change or progressive symptoms we will continue to monitor.  Discussed with increase in size or pain, would recommend reimaging.    Adult idiopathic generalized osteoporosis  Noted in her chart but do not see DEXA scan, has kyphosis and likely does have osteoporosis.  Discussed DEXA to evaluate severity.  Patient not interested in this time.  Discussed calcium and vitamin D supplementation which she is taking.  We also discussed fall and fracture risk.    Visit for screening mammogram  Declines.    Special screening for malignant neoplasms, colon  Declines.                  Argenis Forrest is a 63 year old, presenting for the following health issues:  Ear Problem        5/2/2024    10:49 AM   Additional Questions   Roomed by marko johnson   Accompanied by none         5/2/2024    10:49 AM   Patient Reported Additional Medications   Patient reports taking the following new medications none     History of Present Illness       Reason for visit:  Plugged ear    She eats 2-3 servings of fruits and vegetables daily.She consumes 0 sweetened beverage(s) daily.She exercises with enough effort to increase her heart rate 10 to 19 minutes per day.  She exercises with enough effort to increase her heart rate 3 or less days per week.   She is taking medications regularly.         Concern - right ear  Onset: 2-3  days  Description: plugged  Intensity: mild  Progression of Symptoms:  same  Accompanying Signs & Symptoms: ring and while pushing water need to come out  Previous history of similar problem: yes  Precipitating factors:        Worsened by: none  Alleviating factors:        Improved by: none  Therapies tried and outcome:  none   Annual Wellness Visit     Patient has been advised of split billing requirements and indicates understanding: Yes         Health Care Directive  Patient does not have a Health Care Directive or Living Will: Discussed advance care planning with patient; however, patient declined at this time.  In general, how would you rate your overall physical health? good  Do you have a special diet?  Regular (no restrictions)         No data to display              Do you see a dentist two times every year?  Yes  Have you been more tired than usual lately?  (!) YES   Discussed possible causes of fatigue.   If you drink alcohol do you typically have >3 drinks per day or >7 drinks per week? No  Do you have a current opioid prescription? No  Do you use any other controlled substances or medications that are not prescribed by a provider? None  Social History     Tobacco Use    Smoking status: Never    Smokeless tobacco: Never   Substance Use Topics    Alcohol use: Not Currently       Needs assistance for the following daily activities: no assistance needed  Which of the following safety concerns are present in your home?  none identified   Do you (or your family members) have any concerns about your safety while driving?  No  Do you have any of the following hearing concerns?: No hearing concerns  In the past 6 months, have you been bothered by leaking of urine? No        5/2/2024   Social Factors   Worry food won't last until get money to buy more No   Food not last or not have enough money for food? No   Do you have housing?  Yes   Are you worried about losing your housing? No   Lack of transportation? No    Unable to get utilities (heat,electricity)? No          Today's PHQ-2 Score:       2024    10:52 AM   PHQ-2 (  [employer])   Q1: Little interest or pleasure in doing things 0   Q2: Feeling down, depressed or hopeless 0   PHQ-2 Score 0   Q1: Little interest or pleasure in doing things Not at all   Q2: Feeling down, depressed or hopeless Not at all   PHQ-2 Score 0          Mammogram Screening - Mammography discussed and declined      History of abnormal Pap smear: NO - age 63 - see link Cervical Cytology Screening Guidelines       ASCVD Risk   The ASCVD Risk score (Zachary OSMAN, et al., 2019) failed to calculate for the following reasons:    Cannot find a previous HDL lab    Cannot find a previous total cholesterol lab    Fracture Risk Assessment Tool  Link to Frax Calculator  Use the information below to complete the Frax calculator  : 1960  Sex: female  Weight (kg): 62.1 kg (actual weight)  Height (cm): 170.2 cm  Previous Fragility Fracture:  No  History of parent with fractured hip:  No  Current Smoking:  No  Patient has been on glucocorticoids for more than 3 months (5mg/day or more): No  Rheumatoid Arthritis on Problem List:  No  Secondary Osteoporosis on Problem List:  No  Consumes 3 or more units of alcohol per day: No  Femoral Neck BMD (g/cm2)            Reviewed and updated as needed this visit by Provider                        Current providers sharing in care for this patient include:  Patient Care Team:  System, Provider Not In as PCP - General (Clinic)  Drake Velazquez MD as MD (Neurology)  Drake Velazquez MD as Assigned Neuroscience Provider    The following health maintenance items are reviewed in Epic and correct as of today:  Health Maintenance   Topic Date Due    DEXA  Never done    ANNUAL REVIEW OF HM ORDERS  Never done    MAMMO SCREENING  Never done    COLORECTAL CANCER SCREENING  Never done    HIV SCREENING  Never done    HEPATITIS C SCREENING  Never done     "LIPID  Never done    DTAP/TDAP/TD IMMUNIZATION (1 - Tdap) 05/14/1999    ZOSTER IMMUNIZATION (1 of 2) Never done    RSV VACCINE (Pregnancy & 60+) (1 - 1-dose 60+ series) Never done    Pneumococcal Vaccine: 65+ Years (1 of 1 - PCV) Never done    COVID-19 Vaccine (1 - 2023-24 season) Never done    INFLUENZA VACCINE (Season Ended) 09/01/2024    GLUCOSE  02/28/2025    MEDICARE ANNUAL WELLNESS VISIT  05/02/2025    FALL RISK ASSESSMENT  05/02/2025    ADVANCE CARE PLANNING  05/02/2029    PHQ-2 (once per calendar year)  Completed    IPV IMMUNIZATION  Aged Out    HPV IMMUNIZATION  Aged Out    MENINGITIS IMMUNIZATION  Aged Out    RSV MONOCLONAL ANTIBODY  Aged Out       Appropriate preventive services were discussed with this patient, including applicable screening as appropriate for fall prevention, nutrition, physical activity, Tobacco-use cessation, weight loss and cognition.  Checklist reviewing preventive services available has been given to the patient.           5/2/2024   Mini Cog   Clock Draw Score 0 Abnormal   3 Item Recall 3 objects recalled   Mini Cog Total Score 3                     Objective    /86   Pulse 88   Temp 97.8  F (36.6  C) (Tympanic)   Resp 18   Ht 1.702 m (5' 7\")   Wt 62.1 kg (137 lb)   SpO2 96%   BMI 21.46 kg/m    Body mass index is 21.46 kg/m .  Physical Exam   GENERAL: alert and no distress  EYES: Eyes grossly normal to inspection, PERRL and conjunctivae and sclerae normal  HENT: normal cephalic/atraumatic, right ear: Cerumen impaction, after irrigation clear and TM unremarkable, left ear: normal: no effusions, no erythema, normal landmarks, nose and mouth without ulcers or lesions, oropharynx clear, and oral mucous membranes moist  NECK: no adenopathy, no asymmetry, masses, or scars  RESP: lungs clear to auscultation - no rales, rhonchi or wheezes  CV: regular rate and rhythm, normal S1 S2, no S3 or S4, no murmur, click or rub, no peripheral edema  MS: no gross musculoskeletal defects " noted, no edema.  Right fifth digit with bony mass to proximal phalanx  SKIN: no suspicious lesions or rashes            Signed Electronically by: Jacque العلي PA-C
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
## 2024-06-17 ENCOUNTER — OFFICE VISIT (OUTPATIENT)
Dept: NEUROLOGY | Facility: CLINIC | Age: 64
End: 2024-06-17
Payer: COMMERCIAL

## 2024-06-17 VITALS
HEART RATE: 56 BPM | BODY MASS INDEX: 21.03 KG/M2 | SYSTOLIC BLOOD PRESSURE: 99 MMHG | WEIGHT: 134 LBS | DIASTOLIC BLOOD PRESSURE: 67 MMHG | HEIGHT: 67 IN

## 2024-06-17 DIAGNOSIS — G24.3 SPASMODIC TORTICOLLIS: Primary | ICD-10-CM

## 2024-06-17 DIAGNOSIS — M81.8 ADULT IDIOPATHIC GENERALIZED OSTEOPOROSIS: ICD-10-CM

## 2024-06-17 PROCEDURE — G2211 COMPLEX E/M VISIT ADD ON: HCPCS | Performed by: PSYCHIATRY & NEUROLOGY

## 2024-06-17 PROCEDURE — 99213 OFFICE O/P EST LOW 20 MIN: CPT | Performed by: PSYCHIATRY & NEUROLOGY

## 2024-06-17 RX ORDER — CLONAZEPAM 0.5 MG/1
0.5 TABLET ORAL 2 TIMES DAILY
Qty: 60 TABLET | Refills: 5 | Status: SHIPPED | OUTPATIENT
Start: 2024-06-17

## 2024-06-17 NOTE — NURSING NOTE
Chief Complaint   Patient presents with    Spasmodic torticollis     Annual follow up.  No new concerns.     Maame Acuna LPN on 6/17/2024 at 1:29 PM
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
## 2024-10-17 ENCOUNTER — OFFICE VISIT (OUTPATIENT)
Dept: FAMILY MEDICINE | Facility: CLINIC | Age: 64
End: 2024-10-17
Payer: COMMERCIAL

## 2024-10-17 VITALS
RESPIRATION RATE: 14 BRPM | HEART RATE: 70 BPM | OXYGEN SATURATION: 97 % | HEIGHT: 67 IN | WEIGHT: 137.4 LBS | TEMPERATURE: 98.4 F | DIASTOLIC BLOOD PRESSURE: 68 MMHG | BODY MASS INDEX: 21.56 KG/M2 | SYSTOLIC BLOOD PRESSURE: 103 MMHG

## 2024-10-17 DIAGNOSIS — L98.9 SKIN LESION: Primary | ICD-10-CM

## 2024-10-17 PROCEDURE — 99213 OFFICE O/P EST LOW 20 MIN: CPT | Performed by: FAMILY MEDICINE

## 2024-10-17 NOTE — PROGRESS NOTES
"  Assessment & Plan     Skin lesion  Nonhealing skin lesion that is been present for almost 1/2 a year left lateral nose area  Discussed with patient suspicion for possible squamous cell carcinoma  Recommend dermatology for biopsy which patient understands and will set up the next available appointment with them                  Subjective   Adirane is a 64 year old, presenting for the following health issues:  Derm Problem (Sore on the side of the nose x 6 months)        10/17/2024     9:50 AM   Additional Questions   Roomed by Elsy north CMA     History of Present Illness       Reason for visit:  Little   She is taking medications regularly.  Patient presented today for evaluation of a nonhealing lesion on the left side of her nose.  There is a small to millimeter circular scabbing lesion on her left nose that she states has been not healing over the last 6 months.  Discussed with her location and visualization is suspicious for squamous cell carcinoma.  I would recommend biopsy with dermatology.  She also has a lesion just of the more distal on the nose which is a pale skin colored raised lesion that should be evaluated as well.  Patient is understanding and plans of making a dermatology appointment close to her home.                  Objective    Temp 98.4  F (36.9  C) (Oral)   Resp 14   Ht 1.702 m (5' 7\")   SpO2 97%   BMI 20.99 kg/m    Body mass index is 20.99 kg/m .  Physical Exam   GENERAL: alert and no distress  EYES: Eyes grossly normal to inspection, PERRL and conjunctivae and sclerae normal  HENT: normal cephalic/atraumatic and left lateral nose with a 2 to 3 mm circular nonhealing lesion  RESP: lungs clear to auscultation - no rales, rhonchi or wheezes  CV: regular rate and rhythm, normal S1 S2, no S3 or S4, no murmur, click or rub, no peripheral edema            Signed Electronically by: Nghia Jones MD    "

## 2025-01-05 DIAGNOSIS — M81.8 ADULT IDIOPATHIC GENERALIZED OSTEOPOROSIS: ICD-10-CM

## 2025-01-05 DIAGNOSIS — G24.3 SPASMODIC TORTICOLLIS: ICD-10-CM

## 2025-01-06 RX ORDER — CLONAZEPAM 0.5 MG/1
0.5 TABLET ORAL 2 TIMES DAILY
Qty: 60 TABLET | Refills: 5 | Status: SHIPPED | OUTPATIENT
Start: 2025-01-06

## 2025-01-06 NOTE — TELEPHONE ENCOUNTER
Refill request for: clonazePAM (KLONOPIN) 0.5 MG tablet    Directions: Take 1 tablet (0.5 mg) by mouth 2 times daily     LOV: 6/17/24  NOV: 6/17/25    30 day supply with 5 refills Medication T'd for review and signature  Meche VARELA CMA on 1/6/2025 at 3:11 PM  [facility]

## 2025-04-23 ENCOUNTER — NURSE TRIAGE (OUTPATIENT)
Dept: NURSING | Facility: CLINIC | Age: 65
End: 2025-04-23
Payer: COMMERCIAL

## 2025-04-23 ENCOUNTER — OFFICE VISIT (OUTPATIENT)
Dept: FAMILY MEDICINE | Facility: CLINIC | Age: 65
End: 2025-04-23
Payer: COMMERCIAL

## 2025-04-23 VITALS
OXYGEN SATURATION: 95 % | BODY MASS INDEX: 21.66 KG/M2 | HEIGHT: 67 IN | SYSTOLIC BLOOD PRESSURE: 118 MMHG | TEMPERATURE: 98.1 F | DIASTOLIC BLOOD PRESSURE: 85 MMHG | RESPIRATION RATE: 16 BRPM | HEART RATE: 79 BPM | WEIGHT: 138 LBS

## 2025-04-23 DIAGNOSIS — N61.0 BREAST INFECTION: Primary | ICD-10-CM

## 2025-04-23 PROCEDURE — 3074F SYST BP LT 130 MM HG: CPT | Performed by: FAMILY MEDICINE

## 2025-04-23 PROCEDURE — 99213 OFFICE O/P EST LOW 20 MIN: CPT | Performed by: FAMILY MEDICINE

## 2025-04-23 PROCEDURE — 3079F DIAST BP 80-89 MM HG: CPT | Performed by: FAMILY MEDICINE

## 2025-04-23 ASSESSMENT — PATIENT HEALTH QUESTIONNAIRE - PHQ9
10. IF YOU CHECKED OFF ANY PROBLEMS, HOW DIFFICULT HAVE THESE PROBLEMS MADE IT FOR YOU TO DO YOUR WORK, TAKE CARE OF THINGS AT HOME, OR GET ALONG WITH OTHER PEOPLE: SOMEWHAT DIFFICULT
SUM OF ALL RESPONSES TO PHQ QUESTIONS 1-9: 12
SUM OF ALL RESPONSES TO PHQ QUESTIONS 1-9: 12

## 2025-04-23 NOTE — TELEPHONE ENCOUNTER
Patient calling about an infection on her left nipple. It is warm to the touch, red, painful, and a scabbing over the center of it. Symptoms started three days ago. Patient has treated with antibiotic ointment. It feels like there is a lump in the nipple or a solid mass to it.   Protocol recommends see PCP within 24 hours  Care advice given. Patient to call back with worsening symptoms. If unable to be seen in primary care patient will present to UC. Location and hours given for UC.  Hillary Kuo RN   04/23/25 5:13 AM  [facility] Nurse Advisor    Reason for Disposition   [1] Breast looks infected (spreading redness, feels hot or painful to touch) AND [2] no fever    Additional Information   Negative: Breastfeeding questions about baby   Negative: Breastfeeding questions about mother (breast symptoms or feeling sick)   Negative: Breastfeeding questions about mother's medicines and diet   Negative: Postpartum breast pain and swelling, not breastfeeding   Negative: Small spot, skin growth or mole   Negative: Chest pain   Negative: [1] SEVERE breast pain AND [2] fever > 103 F (39.4 C)   Negative: Patient sounds very sick or weak to the triager   Negative: [1] Breast looks infected (spreading redness, feels hot or painful to touch) AND [2] fever    Protocols used: Breast Symptoms-A-AH

## 2025-04-23 NOTE — PROGRESS NOTES
"  Assessment & Plan     1. Breast infection (Primary)  - amoxicillin-clavulanate (AUGMENTIN) 875-125 MG tablet; Take 1 tablet by mouth 2 times daily for 10 days.  Dispense: 20 tablet; Refill: 0      Patient presents today for evaluation of possible breast infection/rash.  On exam, she has a tender nodule retroareolar left breast.  Possible infection?  Will initiate Augmentin to cover for mastitis.    We discussed importance of close follow-up.  If no change with antibiotics, strongly encouraged we proceed with either referral to Dr. Bermudez, breast specialist, versus referral for diagnostic mammo/ultrasound.    We will reach out to patient in 1 week for symptom update.    Argenis Forrest is a 64 year old, presenting for the following health issues:  Infection      4/23/2025     2:00 PM   Additional Questions   Roomed by Scottie VARELA MA       HPI      Reports mohs surgery for basal cell on her nose.  Sister with breast cancer.     Developed a rash on her breast. Wondering if mastitis from a bra, irritating?    No itching, tender, sore, warm to touch. Feels like a lump there.   Reports hx dense breast tissue - states they have never been able to see adequately on imaging,typically proceeds to US. States her insurance doesn't cover imaging.     No armpit swelling.  Left breast symptoms, asymptomatic right side.   No fevers or chills.       Review of Systems  See HPI above.         Objective    /85   Pulse 79   Temp 98.1  F (36.7  C)   Resp 16   Ht 1.702 m (5' 7\")   Wt 62.6 kg (138 lb)   SpO2 95%   BMI 21.61 kg/m    Body mass index is 21.61 kg/m .  Physical Exam   GENERAL: alert and no distress  RESP: lungs clear to auscultation - no rales, rhonchi or wheezes  BREAST: Tender nodule left breast retroareolar, surrounding erythema, nipple appears slightly retracted.  CV: regular rate and rhythm, normal S1 S2, no S3 or S4, no murmur, click or rub, no peripheral edema  MS: no gross musculoskeletal defects noted, " no edema          Signed Electronically by: Kemi White, DO

## 2025-04-30 ENCOUNTER — TELEPHONE (OUTPATIENT)
Dept: FAMILY MEDICINE | Facility: CLINIC | Age: 65
End: 2025-04-30
Payer: COMMERCIAL

## 2025-04-30 NOTE — TELEPHONE ENCOUNTER
Called patient and left message to call back,     Please have her speak with a nurse.      ----- Message from Kemi White sent at 4/30/2025  7:36 AM CDT -----  Please connect with Adriane about breast concern for an update.   We treated with antibiotics for possible infection. If not completely normalized, I recommend we do proceed with imaging as discussed at appointment.  Kemi White,         ----- Message -----  From: Kemi White DO  Sent: 4/30/2025  12:00 AM CDT  To: Kemi White DO    Follow up breast lesion

## 2025-05-01 NOTE — TELEPHONE ENCOUNTER
Connected with patient. She will proceed with breast imaging. Provided imaging number.    Williams Llamas RN     [facility]      Received: Today  Kemi White,   [facility] - Primary [facility]  I know patient hasn't yet started antibiotics. I do not think we have any more time to delay investigation with imaging, so have ordered a mammogram with an ultrasound.  Kemi White, DO

## 2025-05-01 NOTE — TELEPHONE ENCOUNTER
Routed separate message to staff to connect.  It is time to proceed with imaging. Orders signed for diagnostic mammogram and ultrasound.  Kemi White, DO

## 2025-05-01 NOTE — TELEPHONE ENCOUNTER
RN was able to connect with patient.  Patient states that she has not started Augmentin as prescribed last week.  She states that when she went to pick it up from the pharmacy the pharmacist talked about diarrhea/GI upset as a possible side effect.  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    Patient has been applying a vinegar solution and over-the-counter topical antibiotic cream to the breast.  Symptoms are not worse but they have not resolved on their own.    Williams Llamas RN     [facility]

## 2025-05-16 ENCOUNTER — TELEPHONE (OUTPATIENT)
Dept: FAMILY MEDICINE | Facility: CLINIC | Age: 65
End: 2025-05-16
Payer: COMMERCIAL

## 2025-05-16 NOTE — TELEPHONE ENCOUNTER
Left message for patient requesting return call to clinic to discuss symptoms further and triage. Attempt 1/3.     RM Carmichael   [facility]

## 2025-05-16 NOTE — TELEPHONE ENCOUNTER
Symptoms    Describe your symptoms: horse throat feels like frog is in it. I dont feel right aches pains fatigue, now im getting a sore throat, runny nose     Any pain: Yes: body    How long have you been having symptoms: 6  days    Have you been seen for this:  No    Appointment offered?: No    Triage offered?: No    Home remedies tried: chicken noodle soup, water    Preferred Pharmacy:   [id number] - [place], [place] - [facility] [address]  [address]  [place] [address]  Phone: [phone] Fax: [phone]      Okay to leave a detailed message?: Yes at Cell number on file:    Telephone Information:   Mobile [phone]

## 2025-05-19 NOTE — TELEPHONE ENCOUNTER
Called patient and left message to call back. Please transfer to nurse for triage/additional discussion.    Thanks!    Williams Llamas RN     [facility]

## 2025-05-20 ENCOUNTER — PATIENT OUTREACH (OUTPATIENT)
Dept: ONCOLOGY | Facility: CLINIC | Age: 65
End: 2025-05-20
Payer: COMMERCIAL

## 2025-05-20 ENCOUNTER — TELEPHONE (OUTPATIENT)
Dept: FAMILY MEDICINE | Facility: CLINIC | Age: 65
End: 2025-05-20

## 2025-05-20 NOTE — TELEPHONE ENCOUNTER
Attempted to call patient, left message for return call to clinic. No further outreach will be done due to max unsuccessful attempts and writer will close encounter. If patient returns call, please transfer to a nurse for triage. Thanks!    Stefanie Almonte RN

## 2025-05-20 NOTE — PROGRESS NOTES
New Patient Oncology Nurse Navigator Note     Referring provider: Kemi White,       Referring Clinic/Organization: [facility]     Referred to (specialty:) Cancer Surgery     Requested provider (if applicable): Dr. Aurora Bermudez     Date Referral Received: May 20, 2025     Evaluation for:  N63.42 (ICD-10-CM) - Subareolar mass of left breast    Clinical History (per Nurse review of records provided):      Adriane Sylvester is a 64 year old female who called referring provider's office on 4/23/25 with concerns about an infection on her left nipple. It is warm to the touch, red, painful, and a scabbing over the center of it. Symptoms started three days prior.    4/23/25 - Dr. White's note indicates a Breast Provider Referral was placed on 4/23 but no evidence in the chart of referral until 5/20. Provider did place diagnostic breast imaging orders on 5/1/25. To date those have not been scheduled. Chart review indicates patient is  and has been hesitant to miss work for imaging.     [place], [place]     HAS SHE HAD ANY BREAST IMAGING IN THE PAST?  HOW ARE HER SYMPTOMS NOW?   ANY FAMILY HISTORY OF BREAST OR OVARIAN CANCER?  Sister breast cancer in her 40s    5/20 1214 - Telephoned and spoke with Adriane. Explained my role and purpose for the call. She was at work and on her break eating lunch. Writer offered to call her at a more convenient time and she declined stating she gets up at 4:30 to get ready for work and doesn't get home until after 6:00pm. Writer requested Adriane use caller ID to call me back directly at a time convenient to her. We agreed to that plan.     5/22 1524 - Having no word back from patient and noting diagnostic breast imaging has not been scheduled, telephoned and left voice message for Adriane. Reminded her of my role and purpose for the call. Advised she call specialty breast scheduling at her earliest convenience to  proceed with breast imaging ([phone]).    Records Location: See Bookmarked material     Records Needed: All breast imaging for past 5 years (although she may not have had any)

## 2025-05-20 NOTE — TELEPHONE ENCOUNTER
Attempted to call patient, left message for return call to clinic. Please see provider notation below when she returns call. Thanks!    Stefanie Almonte RN  ----------------------------------------------------------------------------------------------------------------------

## 2025-06-02 NOTE — TELEPHONE ENCOUNTER
See patient outreach 5/20/25. Breast specialist RN spoke with patient regarding referral and assisted patient in scheduling  breast imaging (6/10/25).

## 2025-06-10 ENCOUNTER — ANCILLARY PROCEDURE (OUTPATIENT)
Dept: MAMMOGRAPHY | Facility: CLINIC | Age: 65
End: 2025-06-10
Attending: FAMILY MEDICINE
Payer: COMMERCIAL

## 2025-06-10 DIAGNOSIS — Z12.31 VISIT FOR SCREENING MAMMOGRAM: ICD-10-CM

## 2025-06-10 DIAGNOSIS — N63.42 SUBAREOLAR MASS OF LEFT BREAST: ICD-10-CM

## 2025-06-10 PROCEDURE — 77067 SCR MAMMO BI INCL CAD: CPT
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
## 2025-06-17 ENCOUNTER — OFFICE VISIT (OUTPATIENT)
Dept: NEUROLOGY | Facility: CLINIC | Age: 65
End: 2025-06-17
Payer: COMMERCIAL

## 2025-06-17 VITALS
HEART RATE: 75 BPM | SYSTOLIC BLOOD PRESSURE: 104 MMHG | BODY MASS INDEX: 21.77 KG/M2 | DIASTOLIC BLOOD PRESSURE: 70 MMHG | WEIGHT: 139 LBS

## 2025-06-17 DIAGNOSIS — M81.8 ADULT IDIOPATHIC GENERALIZED OSTEOPOROSIS: ICD-10-CM

## 2025-06-17 DIAGNOSIS — G24.3 SPASMODIC TORTICOLLIS: Primary | ICD-10-CM

## 2025-06-17 PROCEDURE — 3078F DIAST BP <80 MM HG: CPT | Performed by: PSYCHIATRY & NEUROLOGY

## 2025-06-17 PROCEDURE — 99213 OFFICE O/P EST LOW 20 MIN: CPT | Performed by: PSYCHIATRY & NEUROLOGY

## 2025-06-17 PROCEDURE — G2211 COMPLEX E/M VISIT ADD ON: HCPCS | Performed by: PSYCHIATRY & NEUROLOGY

## 2025-06-17 PROCEDURE — 3074F SYST BP LT 130 MM HG: CPT | Performed by: PSYCHIATRY & NEUROLOGY

## 2025-06-17 RX ORDER — CLONAZEPAM 0.5 MG/1
0.5 TABLET ORAL 2 TIMES DAILY
Qty: 60 TABLET | Refills: 5 | Status: SHIPPED | OUTPATIENT
Start: 2025-06-17
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
## 2025-06-17 NOTE — NURSING NOTE
"Adriane Sylvester is a 64 year old female who presents for:  Chief Complaint   Patient presents with    Spasmodic torticollis     No concerns. Refill request for Clonazepam.        Initial Vitals:  /70   Pulse 75   Wt 63 kg (139 lb)   BMI 21.77 kg/m   Estimated body mass index is 21.77 kg/m  as calculated from the following:    Height as of 4/23/25: 1.702 m (5' 7\").    Weight as of this encounter: 63 kg (139 lb).. Body surface area is 1.73 meters squared. BP completed using cuff size: rocio Van  "

## 2025-08-06 ENCOUNTER — ANCILLARY PROCEDURE (OUTPATIENT)
Dept: MAMMOGRAPHY | Facility: CLINIC | Age: 65
End: 2025-08-06
Attending: FAMILY MEDICINE
Payer: COMMERCIAL

## 2025-08-06 DIAGNOSIS — R92.8 ABNORMAL MAMMOGRAM: ICD-10-CM

## 2025-08-06 PROCEDURE — 76642 ULTRASOUND BREAST LIMITED: CPT | Mod: LT

## 2025-08-06 PROCEDURE — 77061 BREAST TOMOSYNTHESIS UNI: CPT | Mod: LT

## 2025-08-13 ENCOUNTER — ANCILLARY PROCEDURE (OUTPATIENT)
Dept: MAMMOGRAPHY | Facility: CLINIC | Age: 65
End: 2025-08-13
Attending: FAMILY MEDICINE
Payer: COMMERCIAL

## 2025-08-13 DIAGNOSIS — N63.20 LEFT BREAST MASS: ICD-10-CM

## 2025-08-13 DIAGNOSIS — R92.8 OTH ABN AND INCONCLUSIVE FINDINGS ON DX IMAGING OF BREAST: ICD-10-CM

## 2025-08-13 PROCEDURE — 88305 TISSUE EXAM BY PATHOLOGIST: CPT | Mod: TC | Performed by: FAMILY MEDICINE

## 2025-08-13 PROCEDURE — 19083 BX BREAST 1ST LESION US IMAG: CPT | Mod: LT

## 2025-08-13 PROCEDURE — 250N000009 HC RX 250: Performed by: FAMILY MEDICINE

## 2025-08-13 RX ADMIN — LIDOCAINE HYDROCHLORIDE 10 ML: 10 INJECTION, SOLUTION INFILTRATION; PERINEURAL at 14:30

## 2025-08-14 LAB
PATH REPORT.COMMENTS IMP SPEC: NORMAL
PATH REPORT.FINAL DX SPEC: NORMAL
PATH REPORT.GROSS SPEC: NORMAL
PATH REPORT.MICROSCOPIC SPEC OTHER STN: NORMAL
PATH REPORT.RELEVANT HX SPEC: NORMAL
PHOTO IMAGE: NORMAL

## 2025-08-14 PROCEDURE — 88305 TISSUE EXAM BY PATHOLOGIST: CPT | Mod: 26 | Performed by: PATHOLOGY

## 2025-08-19 ENCOUNTER — RESULTS FOLLOW-UP (OUTPATIENT)
Dept: ONCOLOGY | Facility: CLINIC | Age: 65
End: 2025-08-19
Payer: COMMERCIAL